# Patient Record
(demographics unavailable — no encounter records)

---

## 2025-01-13 NOTE — REASON FOR VISIT
[Initial Consultation] : an initial consultation [PCP] : ~pcp~ [Parents] : parents [TextBox_50] : Hydronephrosis

## 2025-01-13 NOTE — PHYSICAL EXAM
[Well developed] : well developed [Well nourished] : well nourished [Acute distress] : no acute distress [Well appearing] : well appearing [Labored breathing] : non- labored breathing [Rigid] : not rigid [Mass] : no mass [Hepatomegaly] : no hepatomegaly [Splenomegaly] : no splenomegaly [Palpable bladder] : no palpable bladder [RUQ Tenderness] : no ruq tenderness [LUQ Tenderness] : no luq tenderness [RLQ Tenderness] : no rlq tenderness [LLQ Tenderness] : no llq tenderness [Right tenderness] : no right tenderness [Left tenderness] : no left tenderness [Renomegaly] : no renomegaly [Right-side mass] : no right-side mass [Left-side mass] : no left-side mass [TextBox_92] : Parent served as chaperone for examination. PENIS: Circumcised. Meatus at tip of the glans without apparent stenosis. No signs of infection. TESTICLES: Bilateral testicles palpable in the dependent position of the scrotum, vertical lie, do no retract, without any masses, induration or tenderness. SCROTAL/INGUINAL: No palpable inguinal hernias or hydroceles.

## 2025-01-13 NOTE — CONSULT LETTER
[FreeTextEntry1] : Dear Dr. MATILDA OSORIO,  I had the pleasure of consulting on THEODORE PALLAN today. Below is my note regarding the office visit today.  Thank you so very much for allowing me to participate in WES's care. Please don't hesitate to call me should any questions or issues arise.  Sincerely, Rai Mahmood MD, FACS, PU Chief, Pediatric Urology Professor of Urology and Pediatrics [facility] [employer]  President, [employer] U[employer] Association - [place] Section Past-President, Societies for Pediatric Urology

## 2025-01-13 NOTE — ASSESSMENT
[FreeTextEntry1] : Estevan has right grade 1 and left grade 2 hydronephrosis. I explained the condition; it's possible causes and implications. We discussed the evaluation and possible management strategies. Imaging in this case includes a sonogram, which was done and a VCUG. I described the VCUG test. I answered all questions. We will reconvene after the study. I also discussed the importance of continuing the antibiotics during the VCUG to avert infection.

## 2025-01-13 NOTE — DATA REVIEWED
[FreeTextEntry1] : EXAMINATION: RENAL/BLADDER AND PELVIC ULTRASOUND PERFORMED IN THE OFFICE TODAY  FINDINGS: RIGHT GRADE 1 AND LEFT GRADE 2 HYDRONEPHROSIS, OTHERWISE UNREMARKABLE KIDNEYS AND PELVIC STRUCTURES.

## 2025-01-13 NOTE — HISTORY OF PRESENT ILLNESS
[TextBox_4] : Estevan is here for an initial consultation. He was born at term after an unassisted conception and uneventful pregnancy. Hydronephrosis was detected in utero at 20 weeks and remained stable through the rest of the pregnancy. No other anomalies noted and the amniotic fluid levels were normal. Post partum, he has been well without any issues feeding or voiding. No fevers or UTIs. Patient placed on Prophylactic Amoxicillin 2mL.

## 2025-01-13 NOTE — CONSULT LETTER
[FreeTextEntry1] : Dear Dr. MATILDA OSORIO,  I had the pleasure of consulting on THEODORE PALLAN today. Below is my note regarding the office visit today.  Thank you so very much for allowing me to participate in WES's care. Please don't hesitate to call me should any questions or issues arise.  Sincerely, Rai Mahmood MD, FACS, PU Chief, Pediatric Urology Professor of Urology and Pediatrics [facility] [employer]  President, American U[employer] Association - [place] Section Past-President, Societies for Pediatric Urology

## 2025-02-13 NOTE — ASSESSMENT
[FreeTextEntry1] : Estevan has right grade 1 and left grade 2 hydronephrosis that is not concerning for obstruction, and he has no reflux. I discussed the results and the management and recommended another sonogram in 6 months. Follow up sooner if there is a UTI and recommended that a urine sample be sent for culture in the event of any fever. Parent verbalize understanding of the plan and state all questions were addressed to their satisfaction.

## 2025-02-13 NOTE — CONSULT LETTER
[FreeTextEntry1] : Dear Dr. MATILDA OSORIO,  I had the pleasure of seeing THEODORE PALLAN for follow up today. Below is my note regarding the office visit today.  Thank you so very much for allowing me to participate in WES's care. Please don't hesitate to call me should any questions or issues arise.  Sincerely, Rai Mahmood MD, FACS, [facility] Chief, Pediatric Urology Professor of Urology and Pediatrics [facility] [employer]  President, American U[employer] Association - [place] Section Past-President, Societies for Pediatric Urology

## 2025-02-13 NOTE — DATA REVIEWED
[FreeTextEntry1] : ACC: [id number]     EXAM:  US ABD TARGET DYN INIT LES   ORDERED BY: LIZETTE ALONSO  PROCEDURE DATE:  02/11/2025  INTERPRETATION:  EXAMINATION: Ultrasound voiding cystourethrogram  HISTORY: Hydronephrosis  COMPARISON: None  TECHNIQUE: Using sterile technique a 6.5 Urdu catheter was inserted into the urinary bladder.  Using ultrasound guidance 1 cc of Lumason Microbubbles were mixed with 500 cc of Saline, which was subsequently instilled into the bladder via gravity.  3 filling/voiding cycles were accomplished.  Findings: The urinary bladder appeared unremarkable. There was no vesicoureteral reflux with filling or voiding. Mild bilateral hydronephrosis was noted. The urethra was normal in appearance.  IMPRESSION:  No vesicoureteral reflux.  --- End of Report ---

## 2025-02-13 NOTE — REASON FOR VISIT
[Home] : at home, [unfilled] , at the time of the visit. [Telehealth (audio & video)] : This visit was provided via telehealth using real-time 2-way audio visual technology. [Follow-Up Visit] : a follow-up visit [Parents] : parents [Medical Office: ([facility])___] : at the medical office located in  [FreeTextEntry3] : Mother [TextBox_50] : Review VCUG Study

## 2025-02-13 NOTE — REASON FOR VISIT
[Home] : at home, [unfilled] , at the time of the visit. [Telehealth (audio & video)] : This visit was provided via telehealth using real-time 2-way audio visual technology. [Follow-Up Visit] : a follow-up visit [Parents] : parents [Medical Office: ([facility])___] : at the medical office located in  [FreeTextEntry3] : Mother [TextBox_50] : Review VCUG Study

## 2025-02-13 NOTE — HISTORY OF PRESENT ILLNESS
[TextBox_4] : I verified the identity of the patient and the reason for the appointment with the parent. I explained to the parent that telemedicine encounters are not the same as a direct patient/healthcare provider visit because the patient and healthcare provider are not in the same room, which can result in limitations, including with the physical examination. I explained that the telemedicine encounter may require the patient's genitalia to be shown. I explained that after the telemedicine encounter, the patient may require an office visit for an in-person physical examination, ultrasound or other testing. I informed the parent that there may be privacy risks associated with the use of the technology and that there may be costs associated with the encounter. I offered the option of an office visit rather than a telemedicine encounter. Parent stated that all explanations were understood, and that all questions were answered to their satisfaction. The parent verbalized their preference and consent to proceed with the telemedicine encounter.  Estevan is a 45-day-old male who presents today for a follow-up visit. At initial consultation (1/13/25), in-office ultrasound demonstrated right grade 1 and left grade 2 hydronephrosis. US-VCUG (2/11/25) demonstrated no vesicoureteral reflux. Presents today to review these results. No reported interval urologic issues.

## 2025-02-13 NOTE — DATA REVIEWED
[FreeTextEntry1] : ACC: [id number]     EXAM:  US ABD TARGET DYN INIT LES   ORDERED BY: LIZETTE ALONSO  PROCEDURE DATE:  02/11/2025  INTERPRETATION:  EXAMINATION: Ultrasound voiding cystourethrogram  HISTORY: Hydronephrosis  COMPARISON: None  TECHNIQUE: Using sterile technique a 6.5 Albanian catheter was inserted into the urinary bladder.  Using ultrasound guidance 1 cc of Lumason Microbubbles were mixed with 500 cc of Saline, which was subsequently instilled into the bladder via gravity.  3 filling/voiding cycles were accomplished.  Findings: The urinary bladder appeared unremarkable. There was no vesicoureteral reflux with filling or voiding. Mild bilateral hydronephrosis was noted. The urethra was normal in appearance.  IMPRESSION:  No vesicoureteral reflux.  --- End of Report ---

## 2025-02-13 NOTE — CONSULT LETTER
[FreeTextEntry1] : Dear Dr. MATILDA OSORIO,  I had the pleasure of seeing THEODORE PALLAN for follow up today. Below is my note regarding the office visit today.  Thank you so very much for allowing me to participate in WES's care. Please don't hesitate to call me should any questions or issues arise.  Sincerely, Rai Mahmood MD, FACS, [facility] Chief, Pediatric Urology Professor of Urology and Pediatrics [facility] [employer]  President, American U[employer] Association - [place] Section Past-President, Societies for Pediatric Urology